# Patient Record
Sex: FEMALE | Race: WHITE | URBAN - METROPOLITAN AREA
[De-identification: names, ages, dates, MRNs, and addresses within clinical notes are randomized per-mention and may not be internally consistent; named-entity substitution may affect disease eponyms.]

---

## 2020-10-08 DIAGNOSIS — Z11.59 ENCOUNTER FOR SCREENING FOR OTHER VIRAL DISEASES: Primary | ICD-10-CM

## 2020-10-24 DIAGNOSIS — Z11.59 ENCOUNTER FOR SCREENING FOR OTHER VIRAL DISEASES: ICD-10-CM

## 2020-10-24 PROCEDURE — U0003 INFECTIOUS AGENT DETECTION BY NUCLEIC ACID (DNA OR RNA); SEVERE ACUTE RESPIRATORY SYNDROME CORONAVIRUS 2 (SARS-COV-2) (CORONAVIRUS DISEASE [COVID-19]), AMPLIFIED PROBE TECHNIQUE, MAKING USE OF HIGH THROUGHPUT TECHNOLOGIES AS DESCRIBED BY CMS-2020-01-R: HCPCS | Performed by: ORTHOPAEDIC SURGERY

## 2020-10-25 LAB
SARS-COV-2 RNA SPEC QL NAA+PROBE: NOT DETECTED
SPECIMEN SOURCE: NORMAL

## 2020-10-26 SDOH — HEALTH STABILITY: MENTAL HEALTH: HOW OFTEN DO YOU HAVE A DRINK CONTAINING ALCOHOL?: NEVER

## 2020-10-28 ENCOUNTER — ANCILLARY PROCEDURE (OUTPATIENT)
Dept: ULTRASOUND IMAGING | Facility: CLINIC | Age: 30
End: 2020-10-28
Attending: STUDENT IN AN ORGANIZED HEALTH CARE EDUCATION/TRAINING PROGRAM
Payer: OTHER MISCELLANEOUS

## 2020-10-28 ENCOUNTER — HOSPITAL ENCOUNTER (OUTPATIENT)
Facility: CLINIC | Age: 30
Discharge: HOME | End: 2020-10-28
Attending: ORTHOPAEDIC SURGERY | Admitting: ORTHOPAEDIC SURGERY
Payer: OTHER MISCELLANEOUS

## 2020-10-28 ENCOUNTER — APPOINTMENT (OUTPATIENT)
Dept: GENERAL RADIOLOGY | Facility: CLINIC | Age: 30
End: 2020-10-28
Attending: ORTHOPAEDIC SURGERY
Payer: OTHER MISCELLANEOUS

## 2020-10-28 ENCOUNTER — ANESTHESIA EVENT (OUTPATIENT)
Dept: SURGERY | Facility: CLINIC | Age: 30
End: 2020-10-28
Payer: OTHER MISCELLANEOUS

## 2020-10-28 ENCOUNTER — ANESTHESIA (OUTPATIENT)
Dept: SURGERY | Facility: CLINIC | Age: 30
End: 2020-10-28
Payer: OTHER MISCELLANEOUS

## 2020-10-28 VITALS
HEIGHT: 65 IN | RESPIRATION RATE: 14 BRPM | WEIGHT: 293 LBS | HEART RATE: 80 BPM | OXYGEN SATURATION: 95 % | TEMPERATURE: 97.7 F | DIASTOLIC BLOOD PRESSURE: 77 MMHG | BODY MASS INDEX: 48.82 KG/M2 | SYSTOLIC BLOOD PRESSURE: 136 MMHG

## 2020-10-28 DIAGNOSIS — M79.672 LEFT FOOT PAIN: Primary | ICD-10-CM

## 2020-10-28 LAB
GLUCOSE BLDC GLUCOMTR-MCNC: 77 MG/DL (ref 70–99)
HCG UR QL: NEGATIVE

## 2020-10-28 PROCEDURE — C1713 ANCHOR/SCREW BN/BN,TIS/BN: HCPCS | Performed by: ORTHOPAEDIC SURGERY

## 2020-10-28 PROCEDURE — 999N001017 HC STATISTIC GLUCOSE BY METER IP

## 2020-10-28 PROCEDURE — 250N000003 HC SEVOFLURANE, EA 15 MIN: Performed by: ORTHOPAEDIC SURGERY

## 2020-10-28 PROCEDURE — 761N000003 HC RECOVERY PHASE 1 LEVEL 2 FIRST HR: Performed by: ORTHOPAEDIC SURGERY

## 2020-10-28 PROCEDURE — 999N000140 HC STATISTIC PRE-PROCEDURE ASSESSMENT III: Performed by: ORTHOPAEDIC SURGERY

## 2020-10-28 PROCEDURE — 258N000003 HC RX IP 258 OP 636: Performed by: ORTHOPAEDIC SURGERY

## 2020-10-28 PROCEDURE — 360N000023 HC SURGERY LEVEL 3 EA 15 ADDTL MIN UMMC: Performed by: ORTHOPAEDIC SURGERY

## 2020-10-28 PROCEDURE — 258N000003 HC RX IP 258 OP 636: Performed by: STUDENT IN AN ORGANIZED HEALTH CARE EDUCATION/TRAINING PROGRAM

## 2020-10-28 PROCEDURE — 370N000002 HC ANESTHESIA TECHNICAL FEE, EACH ADDTL 15 MIN: Performed by: ORTHOPAEDIC SURGERY

## 2020-10-28 PROCEDURE — 250N000009 HC RX 250: Performed by: STUDENT IN AN ORGANIZED HEALTH CARE EDUCATION/TRAINING PROGRAM

## 2020-10-28 PROCEDURE — 370N000001 HC ANESTHESIA TECHNICAL FEE, 1ST 30 MIN: Performed by: ORTHOPAEDIC SURGERY

## 2020-10-28 PROCEDURE — 258N000003 HC RX IP 258 OP 636: Performed by: NURSE ANESTHETIST, CERTIFIED REGISTERED

## 2020-10-28 PROCEDURE — 250N000011 HC RX IP 250 OP 636: Performed by: ORTHOPAEDIC SURGERY

## 2020-10-28 PROCEDURE — 271N000001 HC OR GENERAL SUPPLY NON-STERILE: Performed by: ORTHOPAEDIC SURGERY

## 2020-10-28 PROCEDURE — 250N000013 HC RX MED GY IP 250 OP 250 PS 637: Performed by: PHYSICIAN ASSISTANT

## 2020-10-28 PROCEDURE — 999N000180 XR SURGERY CARM FLUORO LESS THAN 5 MIN: Mod: TC

## 2020-10-28 PROCEDURE — 73630 X-RAY EXAM OF FOOT: CPT | Mod: 26 | Performed by: ORTHOPAEDIC SURGERY

## 2020-10-28 PROCEDURE — 272N000001 HC OR GENERAL SUPPLY STERILE: Performed by: ORTHOPAEDIC SURGERY

## 2020-10-28 PROCEDURE — 28730 FUSION OF FOOT BONES: CPT | Mod: AS | Performed by: PHYSICIAN ASSISTANT

## 2020-10-28 PROCEDURE — 81025 URINE PREGNANCY TEST: CPT | Performed by: ANESTHESIOLOGY

## 2020-10-28 PROCEDURE — 250N000009 HC RX 250: Performed by: NURSE ANESTHETIST, CERTIFIED REGISTERED

## 2020-10-28 PROCEDURE — 250N000011 HC RX IP 250 OP 636: Performed by: NURSE ANESTHETIST, CERTIFIED REGISTERED

## 2020-10-28 PROCEDURE — 761N000007 HC RECOVERY PHASE 2 EACH 15 MINS: Performed by: ORTHOPAEDIC SURGERY

## 2020-10-28 PROCEDURE — 360N000025 HC SURGERY LEVEL 3 W FLUORO 1ST 30 MIN - UMMC: Performed by: ORTHOPAEDIC SURGERY

## 2020-10-28 PROCEDURE — 28730 FUSION OF FOOT BONES: CPT | Mod: LT | Performed by: ORTHOPAEDIC SURGERY

## 2020-10-28 PROCEDURE — 250N000011 HC RX IP 250 OP 636: Performed by: STUDENT IN AN ORGANIZED HEALTH CARE EDUCATION/TRAINING PROGRAM

## 2020-10-28 RX ORDER — TRANEXAMIC ACID 650 MG/1
1950 TABLET ORAL ONCE
Status: COMPLETED | OUTPATIENT
Start: 2020-10-28 | End: 2020-10-28

## 2020-10-28 RX ORDER — BUPIVACAINE HYDROCHLORIDE 2.5 MG/ML
INJECTION, SOLUTION EPIDURAL; INFILTRATION; INTRACAUDAL PRN
Status: DISCONTINUED | OUTPATIENT
Start: 2020-10-28 | End: 2020-10-28

## 2020-10-28 RX ORDER — DEXAMETHASONE SODIUM PHOSPHATE 4 MG/ML
INJECTION, SOLUTION INTRA-ARTICULAR; INTRALESIONAL; INTRAMUSCULAR; INTRAVENOUS; SOFT TISSUE PRN
Status: DISCONTINUED | OUTPATIENT
Start: 2020-10-28 | End: 2020-10-28

## 2020-10-28 RX ORDER — SODIUM CHLORIDE, SODIUM LACTATE, POTASSIUM CHLORIDE, CALCIUM CHLORIDE 600; 310; 30; 20 MG/100ML; MG/100ML; MG/100ML; MG/100ML
INJECTION, SOLUTION INTRAVENOUS CONTINUOUS PRN
Status: DISCONTINUED | OUTPATIENT
Start: 2020-10-28 | End: 2020-10-28

## 2020-10-28 RX ORDER — ONDANSETRON 2 MG/ML
4 INJECTION INTRAMUSCULAR; INTRAVENOUS EVERY 30 MIN PRN
Status: DISCONTINUED | OUTPATIENT
Start: 2020-10-28 | End: 2020-10-28 | Stop reason: HOSPADM

## 2020-10-28 RX ORDER — CEFAZOLIN SODIUM 2 G/100ML
2 INJECTION, SOLUTION INTRAVENOUS
Status: DISCONTINUED | OUTPATIENT
Start: 2020-10-28 | End: 2020-10-28 | Stop reason: DRUGHIGH

## 2020-10-28 RX ORDER — HYDROXYZINE HYDROCHLORIDE 25 MG/1
25 TABLET, FILM COATED ORAL
Status: DISCONTINUED | OUTPATIENT
Start: 2020-10-28 | End: 2020-10-28 | Stop reason: HOSPADM

## 2020-10-28 RX ORDER — NALOXONE HYDROCHLORIDE 0.4 MG/ML
.1-.4 INJECTION, SOLUTION INTRAMUSCULAR; INTRAVENOUS; SUBCUTANEOUS
Status: DISCONTINUED | OUTPATIENT
Start: 2020-10-28 | End: 2020-10-28 | Stop reason: HOSPADM

## 2020-10-28 RX ORDER — ONDANSETRON 2 MG/ML
INJECTION INTRAMUSCULAR; INTRAVENOUS PRN
Status: DISCONTINUED | OUTPATIENT
Start: 2020-10-28 | End: 2020-10-28

## 2020-10-28 RX ORDER — ONDANSETRON 4 MG/1
4 TABLET, ORALLY DISINTEGRATING ORAL EVERY 30 MIN PRN
Status: DISCONTINUED | OUTPATIENT
Start: 2020-10-28 | End: 2020-10-28 | Stop reason: HOSPADM

## 2020-10-28 RX ORDER — HYDROMORPHONE HYDROCHLORIDE 1 MG/ML
.3-.5 INJECTION, SOLUTION INTRAMUSCULAR; INTRAVENOUS; SUBCUTANEOUS EVERY 10 MIN PRN
Status: DISCONTINUED | OUTPATIENT
Start: 2020-10-28 | End: 2020-10-28 | Stop reason: HOSPADM

## 2020-10-28 RX ORDER — OXYCODONE HYDROCHLORIDE 5 MG/1
5 TABLET ORAL
Status: DISCONTINUED | OUTPATIENT
Start: 2020-10-28 | End: 2020-10-28 | Stop reason: HOSPADM

## 2020-10-28 RX ORDER — FLUMAZENIL 0.1 MG/ML
0.2 INJECTION, SOLUTION INTRAVENOUS
Status: DISCONTINUED | OUTPATIENT
Start: 2020-10-28 | End: 2020-10-28 | Stop reason: HOSPADM

## 2020-10-28 RX ORDER — SODIUM CHLORIDE, SODIUM LACTATE, POTASSIUM CHLORIDE, CALCIUM CHLORIDE 600; 310; 30; 20 MG/100ML; MG/100ML; MG/100ML; MG/100ML
INJECTION, SOLUTION INTRAVENOUS CONTINUOUS
Status: DISCONTINUED | OUTPATIENT
Start: 2020-10-28 | End: 2020-10-28 | Stop reason: HOSPADM

## 2020-10-28 RX ORDER — ACETAMINOPHEN 325 MG/1
650 TABLET ORAL
Status: DISCONTINUED | OUTPATIENT
Start: 2020-10-28 | End: 2020-10-28 | Stop reason: HOSPADM

## 2020-10-28 RX ORDER — MEPERIDINE HYDROCHLORIDE 25 MG/ML
12.5 INJECTION INTRAMUSCULAR; INTRAVENOUS; SUBCUTANEOUS
Status: DISCONTINUED | OUTPATIENT
Start: 2020-10-28 | End: 2020-10-28 | Stop reason: HOSPADM

## 2020-10-28 RX ORDER — OXYCODONE HYDROCHLORIDE 5 MG/1
5-10 TABLET ORAL EVERY 4 HOURS PRN
Qty: 26 TABLET | Refills: 0 | Status: SHIPPED | OUTPATIENT
Start: 2020-10-28

## 2020-10-28 RX ORDER — CEFAZOLIN SODIUM 1 G/3ML
1 INJECTION, POWDER, FOR SOLUTION INTRAMUSCULAR; INTRAVENOUS SEE ADMIN INSTRUCTIONS
Status: DISCONTINUED | OUTPATIENT
Start: 2020-10-28 | End: 2020-10-28 | Stop reason: HOSPADM

## 2020-10-28 RX ORDER — HYDROXYZINE HYDROCHLORIDE 25 MG/1
25 TABLET, FILM COATED ORAL EVERY 8 HOURS PRN
Qty: 30 TABLET | Refills: 0 | Status: SHIPPED | OUTPATIENT
Start: 2020-10-28

## 2020-10-28 RX ORDER — FENTANYL CITRATE 50 UG/ML
INJECTION, SOLUTION INTRAMUSCULAR; INTRAVENOUS PRN
Status: DISCONTINUED | OUTPATIENT
Start: 2020-10-28 | End: 2020-10-28

## 2020-10-28 RX ORDER — EPHEDRINE SULFATE 50 MG/ML
INJECTION, SOLUTION INTRAMUSCULAR; INTRAVENOUS; SUBCUTANEOUS PRN
Status: DISCONTINUED | OUTPATIENT
Start: 2020-10-28 | End: 2020-10-28

## 2020-10-28 RX ORDER — PROPOFOL 10 MG/ML
INJECTION, EMULSION INTRAVENOUS PRN
Status: DISCONTINUED | OUTPATIENT
Start: 2020-10-28 | End: 2020-10-28

## 2020-10-28 RX ORDER — FENTANYL CITRATE 50 UG/ML
25-50 INJECTION, SOLUTION INTRAMUSCULAR; INTRAVENOUS
Status: DISCONTINUED | OUTPATIENT
Start: 2020-10-28 | End: 2020-10-28 | Stop reason: HOSPADM

## 2020-10-28 RX ORDER — LIDOCAINE HYDROCHLORIDE 20 MG/ML
INJECTION, SOLUTION INFILTRATION; PERINEURAL PRN
Status: DISCONTINUED | OUTPATIENT
Start: 2020-10-28 | End: 2020-10-28

## 2020-10-28 RX ADMIN — SODIUM CHLORIDE, POTASSIUM CHLORIDE, SODIUM LACTATE AND CALCIUM CHLORIDE: 600; 310; 30; 20 INJECTION, SOLUTION INTRAVENOUS at 11:30

## 2020-10-28 RX ADMIN — DEXAMETHASONE SODIUM PHOSPHATE 2 MG: 4 INJECTION, SOLUTION INTRAMUSCULAR; INTRAVENOUS at 10:30

## 2020-10-28 RX ADMIN — FENTANYL CITRATE 50 MCG: 50 INJECTION, SOLUTION INTRAMUSCULAR; INTRAVENOUS at 10:10

## 2020-10-28 RX ADMIN — DEXAMETHASONE SODIUM PHOSPHATE 10 MG: 4 INJECTION, SOLUTION INTRAMUSCULAR; INTRAVENOUS at 11:57

## 2020-10-28 RX ADMIN — FENTANYL CITRATE 50 MCG: 50 INJECTION, SOLUTION INTRAMUSCULAR; INTRAVENOUS at 11:30

## 2020-10-28 RX ADMIN — BUPIVACAINE HYDROCHLORIDE 20 ML: 2.5 INJECTION, SOLUTION EPIDURAL; INFILTRATION; INTRACAUDAL at 10:30

## 2020-10-28 RX ADMIN — MIDAZOLAM 1 MG: 1 INJECTION INTRAMUSCULAR; INTRAVENOUS at 11:22

## 2020-10-28 RX ADMIN — DEXMEDETOMIDINE HYDROCHLORIDE 40 MCG: 100 INJECTION, SOLUTION INTRAVENOUS at 10:30

## 2020-10-28 RX ADMIN — Medication 5 MG: at 12:22

## 2020-10-28 RX ADMIN — Medication 140 MG: at 11:30

## 2020-10-28 RX ADMIN — ONDANSETRON 4 MG: 2 INJECTION INTRAMUSCULAR; INTRAVENOUS at 11:57

## 2020-10-28 RX ADMIN — PROPOFOL 70 MG: 10 INJECTION, EMULSION INTRAVENOUS at 11:42

## 2020-10-28 RX ADMIN — MIDAZOLAM 1 MG: 1 INJECTION INTRAMUSCULAR; INTRAVENOUS at 09:56

## 2020-10-28 RX ADMIN — MIDAZOLAM 1 MG: 1 INJECTION INTRAMUSCULAR; INTRAVENOUS at 10:10

## 2020-10-28 RX ADMIN — PROPOFOL 100 MG: 10 INJECTION, EMULSION INTRAVENOUS at 11:39

## 2020-10-28 RX ADMIN — TRANEXAMIC ACID 1950 MG: 650 TABLET ORAL at 09:04

## 2020-10-28 RX ADMIN — LIDOCAINE HYDROCHLORIDE 100 MG: 20 INJECTION, SOLUTION INFILTRATION; PERINEURAL at 11:30

## 2020-10-28 RX ADMIN — CEFAZOLIN 3 G: 1 INJECTION, POWDER, FOR SOLUTION INTRAMUSCULAR; INTRAVENOUS at 11:51

## 2020-10-28 RX ADMIN — FENTANYL CITRATE 50 MCG: 50 INJECTION, SOLUTION INTRAMUSCULAR; INTRAVENOUS at 12:00

## 2020-10-28 RX ADMIN — PROPOFOL 200 MG: 10 INJECTION, EMULSION INTRAVENOUS at 11:30

## 2020-10-28 ASSESSMENT — MIFFLIN-ST. JEOR: SCORE: 2130.88

## 2020-10-28 NOTE — ANESTHESIA PROCEDURE NOTES
Peripheral Nerve Block Procedure Note      Staff -   Anesthesiologist:  Mariano Pino MD  Resident/Fellow: Iain Phelan MD  Performed By: anesthesiologist and with fellow  Location: Pre-op  Procedure Start/Stop TImes:     patient identified, IV checked, site marked, risks and benefits discussed, informed consent, monitors and equipment checked, pre-op evaluation, at physician/surgeon's request and post-op pain management      Correct Patient: Yes      Correct Position: Yes      Correct Site: Yes      Correct Procedure: Yes      Correct Laterality:  Yes    Site Marked:  Yes  Procedure details:     Procedure:  Adductor canal    Diagnosis:  Postoperative pain relief    Laterality:  Left    Position:  Supine    Sterile Prep: chloraprep, mask and sterile gloves      Local skin infiltration:  None    Needle:  Insulated    Needle gauge:  21    Needle length (mm):  110    Ultrasound: Yes      Ultrasound used to identify targeted nerve, plexus, or vascular structure and placed a needle adjacent to it      Permanent Image entered into patiient's record      Abnormal pain on injection: No      Blood Aspirated: No      Paresthesias:  No    Bleeding at site: No      Bolus via:  Needle    Infusion Method:  Single Shot    Complications:  None  Assessment/Narrative:     Injection made incrementally with aspirations every (mL):  5

## 2020-10-28 NOTE — ANESTHESIA PREPROCEDURE EVALUATION
"Anesthesia Pre-Procedure Evaluation    Patient: Brittni Ramos   MRN:     [id number] Gender:   female   Age:    29 year old :      1990        Preoperative Diagnosis: Ankle arthritis [M19.079]   Procedure(s):  Left Foot 1st, 2nd, 3rd tarsometatarsal joint arthrodesis     LABS:  CBC: No results found for: WBC, HGB, HCT, PLT  BMP: No results found for: NA, POTASSIUM, CHLORIDE, CO2, BUN, CR, GLC  COAGS: No results found for: PTT, INR, FIBR  POC:   Lab Results   Component Value Date    BGM 77 10/28/2020    HCG Negative 10/28/2020     OTHER: No results found for: PH, LACT, A1C, MEGHANA, PHOS, MAG, ALBUMIN, PROTTOTAL, ALT, AST, GGT, ALKPHOS, BILITOTAL, BILIDIRECT, LIPASE, AMYLASE, BAO, TSH, T4, T3, CRP, SED     Preop Vitals    BP Readings from Last 3 Encounters:   10/28/20 (!) 134/101    Pulse Readings from Last 3 Encounters:   10/28/20 71      Resp Readings from Last 3 Encounters:   10/28/20 16    SpO2 Readings from Last 3 Encounters:   10/28/20 100%      Temp Readings from Last 1 Encounters:   10/28/20 36.5  C (97.7  F) (Oral)    Ht Readings from Last 1 Encounters:   10/28/20 1.651 m (5' 5\")      Wt Readings from Last 1 Encounters:   10/28/20 140.5 kg (309 lb 11.9 oz)    Estimated body mass index is 51.54 kg/m  as calculated from the following:    Height as of this encounter: 1.651 m (5' 5\").    Weight as of this encounter: 140.5 kg (309 lb 11.9 oz).     LDA:        History reviewed. No pertinent past medical history.   Past Surgical History:   Procedure Laterality Date     BACK SURGERY      for scoliosis     FOOT SURGERY Left       No Known Allergies     Anesthesia Evaluation     . Pt has had prior anesthetic.     No history of anesthetic complications          ROS/MED HX    ENT/Pulmonary:       Neurologic:       Cardiovascular:         METS/Exercise Tolerance:     Hematologic:         Musculoskeletal:         GI/Hepatic:     (+) GERD Asymptomatic on medication,       Renal/Genitourinary:         Endo:     (+) " Obesity, .      Psychiatric:         Infectious Disease:         Malignancy:         Other:                         PHYSICAL EXAM:   Mental Status/Neuro: A/A/O   Airway: Facies: Feasible  Mallampati: I  Mouth/Opening: Full  TM distance: > 6 cm  Neck ROM: Full   Respiratory: Auscultation: CTAB     Resp. Rate: Normal     Resp. Effort: Normal      CV: Rhythm: Regular  Rate: Age appropriate  Heart: Normal Sounds  Edema: None   Comments:      Dental: Normal Dentition Habitus: Morbid               Assessment:   ASA SCORE: 3            Plan:   Anes. Type:  General; Peripheral Nerve Block; For Post-op pain in coordination with surgeon   Pre-Medication: None   Induction:  N/a   Airway: LMA   Access/Monitoring: PIV   Maintenance: N/a     Postop Plan:   Postop Pain: Regional  Postop Sedation/Airway: Not planned     PONV Management: Adult Risk Factors: Female   Prevention: Ondansetron     CONSENT: Direct conversation   Plan and risks discussed with: Patient   Blood Products: Consented (ALL Blood Products)                   Fransisco Rosario MD

## 2020-10-28 NOTE — BRIEF OP NOTE
[facility]     Brief Operative Note    Pre-operative diagnosis: Midfoot arthritis   Post-operative diagnosis Same as pre-operative diagnosis    Procedure: Procedure(s):  Left Foot 1st, 2nd, 3rd tarsometatarsal joint arthrodesis  Surgeon: Surgeon(s) and Role:     * Kennedy Jenkins MD - Primary     * Emilee Hudson PA-C - Assisting  Anesthesia: General   Estimated blood loss: 20ml  Drains: None  Specimens: * No specimens in log *  Findings:   None.  Complications: None.  Implants:   Implant Name Type Inv. Item Serial No.  Lot No. LRB No. Used Action   SVETA AxSOS 4.0MM CANCELLOUS SCREW PT 30mm Metallic Hardware/Ryde   [employer]ET[employer]  Left 1 Implanted and Explanted   SVETA AxSOS 4.0MM CANCELLOUS SCREW PT 34mm Metallic Hardware/Ryde   SVET[employer]  Left 1 Implanted   SVETA AxSOS 4.0MM CANCELLOUS SCREW PT 34mm Metallic Hardware/Ryde   SVETA  Left 1 Implanted and Explanted   SVETA AxSOS 4.0MM CANCELLOUS SCREW PT 38mm Metallic Hardware/Ryde   SVETA  Left 1 Implanted   SVETA AxSOS 4.0MM CANCELLOUS SCREW PT WASHER Metallic Hardware/Ryde   SVETA  Left 1 Implanted and Explanted   SVETA AxSOS 4.0MM CANCELLOUS SCREW PT 40mm Metallic Hardware/Ryde   SVET[employer]  Left 2 Implanted   SVETA AxSOS 4.0MM CANCELLOUS SCREW PT 40mm Metallic Hardware/Ryde   SVETA  Left 1 Implanted and Explanted       Plan:  Same Day surgery discharge to home once criteria met.  Splint to remain on left  lower extremity and NWB at all times.  Oxycodone and atarax for pain.  No dressing change on own.  Leave dressing on until 1 week follow up appointment.  F/U in clinic in 1 week    I was asked by Dr. Jenkins to assist with surgery. I positioned and prepped the patient. I retracted soft tissue.   I suctioned fluids when needed. I provided traction for dissection. I helped to ligate blood vessels. I helped Dr. Jenkins identify and protect important structures. The  procedure was medically necessary for an assistant because Dr. Jenkins needed the operative exposure and assistance that I provided. This allowed him to safely and efficiently operate. It was also important that I help ligate blood vessels to maintain hemostasis and reduce the bleeding risk. I helped with the closure of the operative incisions as well as helping with the boot/cast/splint.  The assistance that I provided reduced operative time which meant less general anesthetic for the patient. No qualified residents were available to assist.    Emilee Hudson PA-C PA-C

## 2020-10-28 NOTE — ANESTHESIA POSTPROCEDURE EVALUATION
Anesthesia POST Procedure Evaluation    Patient: Brittni Ramos   MRN:     [id number] Gender:   female   Age:    29 year old :      1990        Preoperative Diagnosis: Ankle arthritis [M19.079]   Procedure(s):  Left Foot 1st, 2nd, 3rd tarsometatarsal joint arthrodesis   Postop Comments: No value filed.     Anesthesia Type: General, Peripheral Nerve Block, For Post-op pain in coordination with surgeon          Postop Pain Control: Uneventful            Sign Out: Well controlled pain   PONV: No   Neuro/Psych: Uneventful            Sign Out: Acceptable/Baseline neuro status   Airway/Respiratory: Uneventful            Sign Out: Acceptable/Baseline resp. status   CV/Hemodynamics: Uneventful            Sign Out: Acceptable CV status   Other NRE: NONE   DID A NON-ROUTINE EVENT OCCUR? No         Last Anesthesia Record Vitals:  CRNA VITALS  10/28/2020 1236 - 10/28/2020 1336      10/28/2020             NIBP:  136/70    Pulse:  80          Last PACU Vitals:  Vitals Value Taken Time   /73 10/28/20 1345   Temp 36.2  C (97.2  F) 10/28/20 1310   Pulse 82 10/28/20 1352   Resp 17 10/28/20 1352   SpO2 92 % 10/28/20 1352   Temp src     NIBP 136/70 10/28/20 1309   Pulse 80 10/28/20 1309   SpO2     Resp     Temp     Ht Rate     Temp 2     Vitals shown include unvalidated device data.      Electronically Signed By: Fransisco Rosario MD, 2020, 1:53 PM

## 2020-10-28 NOTE — DISCHARGE INSTRUCTIONS
POSTOPERATIVE INSTRUCTIONS FOOT AND ANKLE SURGERY   Kennedy Jenkins M.D.    Crutches/walker: You must use crutches/walker when up until instructed otherwise.    Bloody drainage on the dressing cast: This is normal. Cast and dressing material act as a sponge.    Bruising of toes: It is normal to experience some bruising in the toes after surgery.    Driving: For surgery on your right foot/ankle, you may not drive unless otherwise instructed. For surgery on the left foot/ankle, driving is not advised for the first week.    Elevate foot/ankle: Keep your operated foot/ankle elevated at or above your heart 95% of the day the first 24-48 hours following surgery (only use the bathroom). Excessive swelling of the operative foot/ankle causes increased pain, problems with the incision healing and problems with the surgical repair healing.  o The foot should be elevated when you sit to eat.  o Avoid sitting with your foot hanging down.  o You may lie on your side for periods during the day/night; however you should still keep       your foot elevated.  It is not advisable to lie on your stomach.    Ice:  Use ice on foot/ankle over dressing/cast for 2-3 days or more.    Lightheadedness when you get up:  To prevent lightheadedness, sit up and let your foot hang down for 3-5 minutes BEFORE you get up.    Can you move your toes? Only if you did not have surgery on your toes.  It is important to actively wiggle you toes at least 5-10 minutes each hour. This will help prevent blood clots.    Dressing/Cast: Keep clean and dry.    Bathing:  Take a sponge bath/tub instead of a shower.  If you choose to shower, cover the cast/dressing with a waterproof covering (heavy duty plastic bag, or purchased shower cover) and sit on a chair/stool. Have someone available to help you if needed.    Cam Walker:  If issued, wear the CAM walker (boot) 24 hours/day until your follow up appointment unless instructed otherwise.    Pain Medications:     o Take with food to avoid nausea related to the medication.  o Take the medication regularly as prescribed to avoid pain becoming difficult to manage.  Generally your pain should improve by the third day. It is not unusual for the pain to be worse a day or two after surgery than it is on the day of surgery.  o Do not take the pain medication more frequently than prescribed.  If the medication does not relieve the pain or does not last the prescribed time, contact your physician.  o As your pain lessens you may diminish your pain medicine intake by taking one pain pill with one plain Tylenol.  (Unless you have a medical condition restricting the use of Tylenol)  o Tylenol is an ingredient in some pain medications.  To avoid liver damage, it must be limited to 8 tablets (500 mg or Extra Strength) or 12 tablets (325 mg or regular strength) per day.  o Do not supplement your pain medication with any type of anti-inflammatory medication (Ibuprofen, Motrin, Aleve, Celebrex, etc.) without first contacting your physician.  o If you need a refill, please call to allow 48-72 hours before you run out of medication, to avoid running out during evening, weekend or holiday hours.    Fever: It is not unusual to run a low grade fever after surgery.  If your temperature is elevated above 100 degrees, lasts for longer than 24 hours or is questionable in any way, contact the physician.    Constipation: all pain medications along with inactivity can cause constipation.  o Increase your fluid intake to AT LEAST 1 quart of water per day.  o Increase your dietary fiber (Bran cereals, whole grains, fruits, and vegetables.) Eat the  P  fruits: peaches, plums, pears, prunes. Anise/black licorice is also known to act as a natural laxative.  o Avoid the BRAT diet, (bananas, rice, applesauce, and toast) as it may increase constipation.  o If no bowel movement occurs 3 days following surgery, a mild over the counter laxative is  "recommended.      Be sure you have your post-operative follow up appointments made.  Call    [facility] appointment and triage number        Safety Tips for Using Crutches    Crutch Fit:    Assume good standing posture with shoulders relaxed and crutch tips 6-8 inches out from the side of the foot.    The underarm pad should fall 2-3 fingers width below the armpit.    The handgrip is positioned level with the wrist to allow 30  flexion at the elbow.    Safety Tips:    Bear weight on your hands, not on your armpits.    Do not add extra padding to the underarm pad. This will, in effect, lengthen the crutches and increase risk of nerve injury.    Wear flat, properly fitting shoes. Do not walk in stocking feet, high heels or slippers.    Household hazards:  --Throw rugs should be removed from floors.  --Stairs should be cleared of obstacles.  --Use extra caution on slippery, highly polished, littered or uneven floor surfaces.  --Check for electric cords.    Check crutch tips for excessive wear and keep wing nuts tight.    While walking, look forward with  head up  and  eyes open.  Take equal length steps.    Use BOTH crutches.    Stairs Sequence:    UP: \"Good\" leg first, followed by  bad  leg, then crutches.    DOWN: Crutches, followed by  bad  leg, \"good\" leg.     Walking with Crutches:    Move both crutches forward at the same time.    Non-Weight Bearing (NWB):  Hold the involved leg up and swing through the crutches with the involved leg. The involved leg does not touch the floor.    Toe Touch Weight Bearing (TTWB): Move the involved leg forward. Rest it lightly on the floor for balance only. Step through the crutches with the uninvolved leg.    Partial Weight Bearing (PWB): Move the involved leg forward. Step down the weight of the leg only.  Step through the crutches with the uninvolved leg.    Weight Bearing As Tolerated (WBAT): Move the involved leg forward. Put as much pressure through the " involved leg as you can tolerate comfortably. Then step through the crutches with the uninvolved leg.    Rev. 2014      Same-Day Surgery   Adult Discharge Orders & Instructions     For 24 hours after surgery:  1. Get plenty of rest.  A responsible adult must stay with you for at least 24 hours after you leave the hospital.   2. Pain medication can slow your reflexes. Do not drive or use heavy equipment.  If you have weakness or tingling, don't drive or use heavy equipment until this feeling goes away.  3. Mixing alcohol and pain medication can cause dizziness and slow your breathing. It can even be fatal. Do not drink alcohol while taking pain medication.  4. Avoid strenuous or risky activities.  Ask for help when climbing stairs.   5. You may feel lightheaded.  If so, sit for a few minutes before standing.  Have someone help you get up.   6. If you have nausea (feel sick to your stomach), drink only clear liquids such as apple juice, ginger ale, broth or 7-Up.  Rest may also help.  Be sure to drink enough fluids.  Move to a regular diet as you feel able. Take pain medications with a small amount of solid food, such as toast or crackers, to avoid nausea.   7. A slight fever is normal. Call the doctor if your fever is over 100 F (37.7 C) (taken under the tongue) or lasts longer than 24 hours.  8. You may have a dry mouth, muscle aches, trouble sleeping or a sore throat.  These symptoms should go away after 24 hours.  9. Do not make important or legal decisions.   Pain Management:      1. Take pain medication (if prescribed) for pain as directed by your physician.        2. WARNING: If the pain medication you have been prescribed contains Tylenol  (acetaminophen), DO NOT take additional doses of Tylenol (acetaminophen).     Call your doctor for any of the followin.  Signs of infection (fever, growing tenderness at the surgery site, severe pain, a large amount of drainage or bleeding, foul-smelling drainage,  redness, swelling).    2.  It has been over 8 to 10 hours since surgery and you are still not able to urinate (pee).    3.  Headache for over 24 hours.    4.  Numbness, tingling or weakness the day after surgery (if you had spinal anesthesia).  To contact a doctor, call ______Dr. Jenkins____Clinic # [phone]____ or:      [phone] and ask for the Resident On Call for:          _________Ortho Resident/Fellow On-Call__________ (answered 24 hours a day)      Emergency Department:  [place] [facility]: [phone]  [place] [facility]: [phone]               Rev. 10/2014

## 2020-10-28 NOTE — ANESTHESIA CARE TRANSFER NOTE
Patient: Brittni Ramos    Procedure(s):  Left Foot 1st, 2nd, 3rd tarsometatarsal joint arthrodesis    Diagnosis: Ankle arthritis [M19.079]  Diagnosis Additional Information: No value filed.    Anesthesia Type:   General, Peripheral Nerve Block, For Post-op pain in coordination with surgeon     Note:  Airway :Face Mask  Patient transferred to:PACU  Comments: To PACU, VSS, airway patent, RN at bedside, patient awake.Handoff Report: Identifed the Patient, Identified the Reponsible Provider, Reviewed the pertinent medical history, Discussed the surgical course, Reviewed Intra-OP anesthesia mangement and issues during anesthesia, Set expectations for post-procedure period and Allowed opportunity for questions and acknowledgement of understanding      Vitals: (Last set prior to Anesthesia Care Transfer)    CRNA VITALS  10/28/2020 1236 - 10/28/2020 1312      10/28/2020             NIBP:  136/70    Pulse:  80                Electronically Signed By: BELKIS Ngo CRNA  October 28, 2020  1:12 PM

## 2020-10-28 NOTE — ANESTHESIA PROCEDURE NOTES
Peripheral Nerve Block Procedure Note      Staff -   Anesthesiologist:  Mariano Pino MD  Resident/Fellow: Iain Phelan MD  Performed By: anesthesiologist and with fellow  Location: Pre-op  Procedure Start/Stop TImes:     patient identified, IV checked, site marked, risks and benefits discussed, informed consent, monitors and equipment checked, pre-op evaluation, at physician/surgeon's request and post-op pain management      Correct Patient: Yes      Correct Position: Yes      Correct Site: Yes      Correct Procedure: Yes      Correct Laterality:  Yes    Site Marked:  Yes  Procedure details:     Procedure:  Popliteal    Diagnosis:  Postoperative pain relief    Laterality:  Left    Position:  Supine    Sterile Prep: chloraprep, mask and sterile gloves      Local skin infiltration:  None    Needle:  Insulated    Needle gauge:  21    Needle length (mm):  110    Ultrasound: Yes      Ultrasound used to identify targeted nerve, plexus, or vascular structure and placed a needle adjacent to it      Permanent Image entered into patiient's record      Abnormal pain on injection: No      Blood Aspirated: No      Paresthesias:  No    Bleeding at site: No      Bolus via:  Needle    Infusion Method:  Single Shot    Complications:  None  Assessment/Narrative:     Injection made incrementally with aspirations every (mL):  5

## 2020-10-28 NOTE — OP NOTE
Procedure Date: 10/28/2020      PREOPERATIVE DIAGNOSIS:  Left first, second and third tarsometatarsal joint arthritis.      POSTOPERATIVE DIAGNOSIS:  Left first, second and third tarsometatarsal joint arthritis.      PROCEDURE:  Left first, second and third tarsometatarsal joint arthrodesis.      SURGEON:  Kennedy Jenkins MD      ASSISTANT:  Emilee Hudson PA-C Mrs. Hudson's assistance was required in order to provide help with positioning of the patient, the surgery itself, holding retractors, closure of the wound and application of immobilization devices.  At the time of surgery, there was no available help from an orthopedic trainee.      COMPLICATIONS:  None.      DRAINS:  None.      ESTIMATED BLOOD LOSS:  Less than 20 mL      ANESTHESIA:  General endotracheal.      INDICATIONS:  Please refer to clinic notes for further details discussing indications of Ms. Ramos's case.      DESCRIPTION OF PROCEDURE:  On 10/28/2020, patient was taken to surgery.  Preoperative antibiotics were administered to the patient prior to arrival to the OR.      After successful induction of general endotracheal anesthesia, she was placed supine on the operating table.  The left lower extremity was prepped and draped in sterile fashion.  After exsanguination by gravity, the tourniquet cuff was inflated to 300 mmHg on the proximal third of the left thigh.      The pause for the cause was performed according to our institution's policy, which confirmed laterality of the procedure.      An incision was made along the dorsal aspect of the foot along the previous surgical incision.  Subcutaneous tissues were dissected.  The dissection was quite difficult secondary to the presence of the previous scar tissue.      We proceeded with exposure of the first, second and third tarsometatarsal joints and resection of the cartilaginous surfaces.  Given the thickness of the soft tissues dorsally, it was also quite difficult to have access to  the joint.  Eventually multiple perforations were created with a 2.5 mm drill bit across all 3 joints.      We proceeded also with multiple attempts of placing screws from dorsal and distal to plantar and proximal, which again was quite difficult secondary to the thickness of the tissues and the limited exposure.      Eventually, we were able to secure the first, second and third tarsometatarsal joints and a fourth screw was placed across the intercuneiform joint.      We confirmed under fluoroscopic examination and 3 views of the left foot to have excellent reduction of the tarsometatarsal joints as well as location of the hardware.  These images were sent to PACS for definitive documentation.      The tourniquet cuff was deflated.  Satisfactory hemostasis was accomplished.  The wound was closed in layers.  Sterile dressings were applied.  The patient was placed in a plaster splint in neutral alignment and transferred in stable condition to PACU.      PLAN:  The patient will remain nonweightbearing x3 months.  She will return to clinic in 2 weeks for a wound check.  At that time, sutures will be removed if indicated, and she will be placed in a short leg cast in a neutral alignment as well.  The patient then will return to clinic at 6 weeks from surgery, and at that time, 3 views of the left foot will be obtained, and based on those findings, further recommendations will be given to the patient.         CRISTINA CARROLL MD             D: 10/28/2020   T: 10/28/2020   MT: prashant      Name:     YARELIS LOPEZ   MRN:      [id number]        Account:        [id number]   :      1990           Procedure Date: 10/28/2020      Document: [id number]

## (undated) DEVICE — SUCTION MANIFOLD DORNOCH ULTRA CART UL-CL500

## (undated) DEVICE — GLOVE PROTEXIS BLUE W/NEU-THERA 8.0  2D73EB80

## (undated) DEVICE — LINEN ORTHO PACK 5446

## (undated) DEVICE — SU VICRYL 2-0 CT-2 27" UND J269H

## (undated) DEVICE — BNDG ELASTIC 4" DBL LENGTH UNSTERILE 6611-14

## (undated) DEVICE — IMPLANTABLE DEVICE: Type: IMPLANTABLE DEVICE | Site: FOOT | Status: NON-FUNCTIONAL

## (undated) DEVICE — DRAPE C-ARM OEC MINI VIEW 6800   00-901917-01

## (undated) DEVICE — BLADE KNIFE SURG 10 371110

## (undated) DEVICE — GLOVE PROTEXIS BLUE W/NEU-THERA 7.0  2D73EB70

## (undated) DEVICE — PACK LOWER EXTREMITY RIVERSIDE SOP32LEFSX

## (undated) DEVICE — CAST PADDING 4" UNSTERILE 9044

## (undated) DEVICE — GLOVE PROTEXIS W/NEU-THERA 7.5  2D73TE75

## (undated) DEVICE — ESU GROUND PAD ADULT W/CORD E7507

## (undated) DEVICE — SU MONOCRYL 3-0 PS-1 27" Y936H

## (undated) DEVICE — IMM LEG ELEVATOR 79-90191

## (undated) DEVICE — TOURNIQUET CUFF 42" REPRO MAROON 60-7070-107

## (undated) DEVICE — GLOVE PROTEXIS MICRO 6.5  2D73PM65

## (undated) DEVICE — DRILL BIT STRK 2.5X125MM GOLD 700347

## (undated) DEVICE — STRAP KNEE/BODY 31143004

## (undated) DEVICE — GOWN XLG DISP 9545

## (undated) DEVICE — SOL WATER IRRIG 1000ML BOTTLE 2F7114

## (undated) DEVICE — SOL NACL 0.9% IRRIG 1000ML BOTTLE 2F7124

## (undated) RX ORDER — FENTANYL CITRATE 50 UG/ML
INJECTION, SOLUTION INTRAMUSCULAR; INTRAVENOUS
Status: DISPENSED
Start: 2020-10-28

## (undated) RX ORDER — BUPIVACAINE HYDROCHLORIDE 5 MG/ML
INJECTION, SOLUTION EPIDURAL; INTRACAUDAL
Status: DISPENSED
Start: 2020-10-28

## (undated) RX ORDER — TRANEXAMIC ACID 650 MG/1
TABLET ORAL
Status: DISPENSED
Start: 2020-10-28